# Patient Record
Sex: MALE | Race: BLACK OR AFRICAN AMERICAN | NOT HISPANIC OR LATINO | Employment: FULL TIME | URBAN - METROPOLITAN AREA
[De-identification: names, ages, dates, MRNs, and addresses within clinical notes are randomized per-mention and may not be internally consistent; named-entity substitution may affect disease eponyms.]

---

## 2017-06-14 ENCOUNTER — HOSPITAL ENCOUNTER (EMERGENCY)
Facility: HOSPITAL | Age: 27
Discharge: HOME | End: 2017-06-14
Attending: EMERGENCY MEDICINE
Payer: COMMERCIAL

## 2017-06-14 VITALS
HEART RATE: 91 BPM | TEMPERATURE: 98 F | RESPIRATION RATE: 18 BRPM | HEIGHT: 69 IN | SYSTOLIC BLOOD PRESSURE: 129 MMHG | OXYGEN SATURATION: 99 % | WEIGHT: 245 LBS | DIASTOLIC BLOOD PRESSURE: 76 MMHG | BODY MASS INDEX: 36.29 KG/M2

## 2017-06-14 DIAGNOSIS — N48.9 PENILE LESION: ICD-10-CM

## 2017-06-14 DIAGNOSIS — N39.0 URINARY TRACT INFECTION WITHOUT HEMATURIA, SITE UNSPECIFIED: ICD-10-CM

## 2017-06-14 DIAGNOSIS — R30.0 DYSURIA: Primary | ICD-10-CM

## 2017-06-14 LAB
BACTERIA #/AREA URNS HPF: ABNORMAL /HPF
BILIRUB UR QL STRIP: NEGATIVE
CLARITY UR: CLEAR
COLOR UR: ABNORMAL
GLUCOSE UR QL STRIP: NEGATIVE
HGB UR QL STRIP: NEGATIVE
KETONES UR QL STRIP: ABNORMAL
LEUKOCYTE ESTERASE UR QL STRIP: NEGATIVE
MICROSCOPIC COMMENT: ABNORMAL
NITRITE UR QL STRIP: NEGATIVE
PH UR STRIP: 5 [PH] (ref 5–8)
PROT UR QL STRIP: NEGATIVE
RBC #/AREA URNS HPF: 0 /HPF (ref 0–4)
SP GR UR STRIP: >1.03 (ref 1–1.03)
SQUAMOUS #/AREA URNS HPF: 1 /HPF
URN SPEC COLLECT METH UR: ABNORMAL
UROBILINOGEN UR STRIP-ACNC: NEGATIVE EU/DL
WBC #/AREA URNS HPF: 8 /HPF (ref 0–5)

## 2017-06-14 PROCEDURE — 86592 SYPHILIS TEST NON-TREP QUAL: CPT

## 2017-06-14 PROCEDURE — 86695 HERPES SIMPLEX TYPE 1 TEST: CPT

## 2017-06-14 PROCEDURE — 87529 HSV DNA AMP PROBE: CPT

## 2017-06-14 PROCEDURE — 86696 HERPES SIMPLEX TYPE 2 TEST: CPT

## 2017-06-14 PROCEDURE — 87591 N.GONORRHOEAE DNA AMP PROB: CPT

## 2017-06-14 PROCEDURE — 99283 EMERGENCY DEPT VISIT LOW MDM: CPT

## 2017-06-14 PROCEDURE — 86695 HERPES SIMPLEX TYPE 1 TEST: CPT | Mod: 59

## 2017-06-14 PROCEDURE — 25000003 PHARM REV CODE 250: Performed by: NURSE PRACTITIONER

## 2017-06-14 PROCEDURE — 81000 URINALYSIS NONAUTO W/SCOPE: CPT

## 2017-06-14 RX ORDER — CEPHALEXIN 500 MG/1
500 CAPSULE ORAL EVERY 12 HOURS
Qty: 14 CAPSULE | Refills: 0 | Status: SHIPPED | OUTPATIENT
Start: 2017-06-14 | End: 2017-06-21

## 2017-06-14 RX ORDER — CEPHALEXIN 250 MG/1
500 CAPSULE ORAL
Status: COMPLETED | OUTPATIENT
Start: 2017-06-14 | End: 2017-06-14

## 2017-06-14 RX ORDER — PHENAZOPYRIDINE HYDROCHLORIDE 200 MG/1
200 TABLET, FILM COATED ORAL 3 TIMES DAILY PRN
Qty: 6 TABLET | Refills: 0 | Status: SHIPPED | OUTPATIENT
Start: 2017-06-14 | End: 2017-06-24

## 2017-06-14 RX ORDER — PHENAZOPYRIDINE HYDROCHLORIDE 100 MG/1
200 TABLET, FILM COATED ORAL
Status: COMPLETED | OUTPATIENT
Start: 2017-06-14 | End: 2017-06-14

## 2017-06-14 RX ADMIN — PHENAZOPYRIDINE HYDROCHLORIDE 200 MG: 100 TABLET ORAL at 12:06

## 2017-06-14 RX ADMIN — CEPHALEXIN 500 MG: 250 CAPSULE ORAL at 12:06

## 2017-06-14 NOTE — ED TRIAGE NOTES
Pt presents with burning during & after voiding x3 days.  States that urine has become darker.  Pt states with redness and lac to the end of penis.

## 2017-06-14 NOTE — DISCHARGE INSTRUCTIONS
Please return to the Emergency Department for any new or worsening symptoms including: worsening pain with urination, worsening penile lesions, drainage from the lesions, fever, chest pain, shortness of breath, loss of consciousness, dizziness, weakness, or any other concerns.     Please follow up with your Primary Care Provider within in the week. If you do not have a Primary Care Provider, you may contact the one listed on your discharge paperwork or you may also call the [facility] Appointment Desk at [phone] to schedule an appointment with a Primary Care Provider.     Please take all medication as prescribed.     Gonorrhea/Chlamydia, Herpes, and Syphilis tests are currently pending. If they are abnormal you will be notified. Please refrain from having intercourse until you receive the results. They may take 2 - 4 days to come back.

## 2017-06-14 NOTE — ED PROVIDER NOTES
Encounter Date: 6/14/2017    SCRIBE #1 NOTE: I, Nitish Metcalf , am scribing for, and in the presence of,  EMELIA Luther . I have scribed the following portions of the note - Other sections scribed: HPI/ROS .       History     Chief Complaint   Patient presents with    Dysuria     c/o burning when he voids over past 3 days. Denies penile discharge.     Review of patient's allergies indicates:  No Known Allergies  CC: Dysuria     HPI: This 26 y.o. male presents to the ED c/o a 3-day hx of acute-onset, constant dysuria with associated dark urine. Pt reports having a burning pain during and after urination. He reports his symptoms have remained unchanged from onset. No prior attempted tx. Pt notes recent unprotected sexual intercourse with one female partner, but denies concerns for a possible sexually transmitted infection. Pt otherwise denies fever, hematuria, penile discharge, abdominal pain, back pain, and N/V/D.         The history is provided by the patient. No  was used.     History reviewed. No pertinent past medical history.  History reviewed. No pertinent surgical history.  History reviewed. No pertinent family history.  Social History   Substance Use Topics    Smoking status: Never Smoker    Smokeless tobacco: Not on file    Alcohol use No     Review of Systems   Constitutional: Negative for chills and fever.   Respiratory: Negative for cough and shortness of breath.    Cardiovascular: Negative for chest pain.   Gastrointestinal: Negative for abdominal pain, diarrhea, nausea and vomiting.   Genitourinary: Positive for dysuria. Negative for decreased urine volume, flank pain, frequency, hematuria, penile pain, penile swelling, scrotal swelling and urgency.        (+) dark urine    Musculoskeletal: Negative for back pain.       Physical Exam     Initial Vitals [06/14/17 1039]   BP Pulse Resp Temp SpO2   130/74 93 18 98.6 °F (37 °C) 99 %     Physical Exam    Nursing note and  vitals reviewed.  Constitutional: He appears well-developed and well-nourished. He is not diaphoretic. He is cooperative.  Non-toxic appearance. No distress.   HENT:   Head: Normocephalic and atraumatic.   Right Ear: External ear normal.   Left Ear: External ear normal.   Mouth/Throat: Oropharynx is clear and moist.   Eyes: Conjunctivae and EOM are normal.   Neck: Normal range of motion.   Cardiovascular: Normal rate.   Pulmonary/Chest: No respiratory distress.   Abdominal: Soft. He exhibits no distension. There is no tenderness. There is no rebound and no CVA tenderness. Hernia confirmed negative in the right inguinal area and confirmed negative in the left inguinal area.   Genitourinary: Testes normal. Cremasteric reflex is present. Right testis shows no mass, no swelling and no tenderness. Left testis shows no mass, no swelling and no tenderness. Uncircumcised. Penile tenderness (with palpation of the distal tip of the penis) present. No penile erythema. No discharge found.         Genitourinary Comments: Exam chaperoned by: JÚNIOR Stewart   Neurological: He is alert and oriented to person, place, and time. GCS eye subscore is 4. GCS verbal subscore is 5. GCS motor subscore is 6.   Skin: Skin is warm, dry and intact. Capillary refill takes less than 2 seconds. Lesion (penile) noted. No rash noted.   Psychiatric: He has a normal mood and affect. His behavior is normal. Judgment and thought content normal.         ED Course   Procedures  Labs Reviewed   URINALYSIS - Abnormal; Notable for the following:        Result Value    Specific Gravity, UA >1.030 (*)     Ketones, UA 1+ (*)     All other components within normal limits   URINALYSIS MICROSCOPIC - Abnormal; Notable for the following:     WBC, UA 8 (*)     All other components within normal limits   C. TRACHOMATIS/N. GONORRHOEAE BY AMP DNA   HERPES SIMPLEX (HSV) BY RAPID PCR, NON-BLOOD    Narrative:     Receiving Lab?->[facility]   RPR   HERPES SIMPLEX 1 & 2 IGM  "  HERPES SIMPLEX 1&2 IGG                   APC / Resident Notes:   This is an evaluation of a 26-year-old male that presents emergency Department with complaints of dysuria.  He reports no concern for STI's at this time.  During the exam he mentioned a small "cut" that he noticed on the shaft of his penis.  Denies any known injury to the area. Physical Exam shows a non-toxic, afebrile, and well appearing male.  Uncircumcised male with a small ulceration on the right side of the foreskin with retraction as well as 2 smaller linear abrasions just proximal to the ulceration.  There is no active drainage, and is not tender, there is no erythema.  He has no lesions on the palms.  There is no inguinal lymphadenopathy or hernia.  Testicles are nontender.  His abdomen is soft and nontender.  There is no CVA tenderness. Vital Signs Are Reassuring. RESULTS: Urinalysis with 8 white blood cells, 1+ ketones.  Urine culture pending.  GC cultures, HSV swab and cultures, and syphilis currently pending.  The patient was made aware of the testing being ordered and advised to abstain from intercourse until results are received.  HSV culture taken from the ulceration.     My overall impression is Dysuria, UTI, Penile Lesion. I considered, but at this time, do not suspect testicular torsion, epididymitis, pyelonephritis.  At this time syphilis, HSV, gonorrhea and Chlamydia remain in the differentials pending the results of that testing.    ED Course: Keflex, Pyridium. D/C Meds: Keflex, Pyridium. Additional D/C Information: STD Precaution. The diagnosis, treatment plan, instructions for follow-up and reevaluation with his PCP as well as ED return precautions were discussed and understanding was verbalized. All questions or concerns have been addressed. This case was discussed with and the patient has been examined by Dr. Davison who is in agreement with my assessment and plan. RORO Bob, FNP-C        Scribe Attestation:   Scribe #1: I " performed the above scribed service and the documentation accurately describes the services I performed. I attest to the accuracy of the note.    Attending Attestation:     Physician Attestation Statement for NP/PA:   I have conducted a face to face encounter with this patient in addition to the NP/PA, due to NP/PA Request    Other NP/PA Attestation Additions:     Physical Exam: Multiple erosions and ulcers on penile shaft   Medical Decision Making: I HAVE REVIEWED THIS CASE WITH MY ADVANCED PRACTICE CLINICIAN.      I HAVE PROVIDED A FACE TO FACE EVALUATION OF THIS PATIENT AT THE REQUEST OF MY ADVANCED PRACTICE CLINICIAN.      REASON:  MEDICAL COMPLEXITY    THE PATIENT'S CONDITION WARRANTED PHYSICIAN INVOLVEMENT.  THE TREATMENT REGIMEN WAS REVIEWED BY ME.  I AGREE WITH THE HISTORY, ROS, PHYSICAL, ASSESSMENT AND PLAN OF CARE AS DOCUMENTED BY MY ADVANCED PRACTICE CLINICIAN.      Exam concerning for HSV.  Findings not classic.  Will tx as HSV.  Testing sent.  Test for syphilis, too.             Physician Attestation for Scribe:  Physician Attestation Statement for Scribe #1: I, EMELIA Luther , reviewed documentation, as scribed by Nitish Metcalf  in my presence, and it is both accurate and complete.                 ED Course     Clinical Impression:   The primary encounter diagnosis was Dysuria. Diagnoses of Urinary tract infection without hematuria, site unspecified and Penile lesion were also pertinent to this visit.    Disposition:   Disposition: Discharged  Condition: Stable       EMELIA Luther  06/14/17 1409       Manuel Davison MD  06/14/17 1493

## 2017-06-15 LAB
C TRACH DNA SPEC QL NAA+PROBE: NOT DETECTED
HSV1 DNA SPEC QL NAA+PROBE: POSITIVE
HSV2 DNA SPEC QL NAA+PROBE: NEGATIVE
N GONORRHOEA DNA SPEC QL NAA+PROBE: NOT DETECTED
RPR SER QL: NORMAL
SPECIMEN SOURCE: ABNORMAL

## 2017-06-16 LAB
HSV1 IGG SERPL QL IA: NEGATIVE
HSV2 IGG SERPL QL IA: NEGATIVE

## 2017-06-20 LAB — HSV1+2 IGM SER IA-ACNC: NORMAL INDEX

## 2021-05-21 ENCOUNTER — LAB VISIT (OUTPATIENT)
Dept: LAB | Facility: OTHER | Age: 31
End: 2021-05-21
Payer: OTHER GOVERNMENT

## 2021-05-21 DIAGNOSIS — Z20.822 ENCOUNTER FOR LABORATORY TESTING FOR COVID-19 VIRUS: ICD-10-CM

## 2021-05-21 PROCEDURE — U0003 INFECTIOUS AGENT DETECTION BY NUCLEIC ACID (DNA OR RNA); SEVERE ACUTE RESPIRATORY SYNDROME CORONAVIRUS 2 (SARS-COV-2) (CORONAVIRUS DISEASE [COVID-19]), AMPLIFIED PROBE TECHNIQUE, MAKING USE OF HIGH THROUGHPUT TECHNOLOGIES AS DESCRIBED BY CMS-2020-01-R: HCPCS | Performed by: NURSE PRACTITIONER

## 2021-05-22 LAB — SARS-COV-2 RNA RESP QL NAA+PROBE: NOT DETECTED

## 2021-05-28 ENCOUNTER — LAB VISIT (OUTPATIENT)
Dept: LAB | Facility: OTHER | Age: 31
End: 2021-05-28
Payer: OTHER GOVERNMENT

## 2021-05-28 DIAGNOSIS — Z20.822 ENCOUNTER FOR LABORATORY TESTING FOR COVID-19 VIRUS: ICD-10-CM

## 2021-05-28 PROCEDURE — U0003 INFECTIOUS AGENT DETECTION BY NUCLEIC ACID (DNA OR RNA); SEVERE ACUTE RESPIRATORY SYNDROME CORONAVIRUS 2 (SARS-COV-2) (CORONAVIRUS DISEASE [COVID-19]), AMPLIFIED PROBE TECHNIQUE, MAKING USE OF HIGH THROUGHPUT TECHNOLOGIES AS DESCRIBED BY CMS-2020-01-R: HCPCS | Performed by: NURSE PRACTITIONER

## 2021-05-29 LAB — SARS-COV-2 RNA RESP QL NAA+PROBE: NOT DETECTED

## 2021-06-04 ENCOUNTER — LAB VISIT (OUTPATIENT)
Dept: LAB | Facility: OTHER | Age: 31
End: 2021-06-04
Payer: OTHER GOVERNMENT

## 2021-06-04 DIAGNOSIS — Z20.822 ENCOUNTER FOR LABORATORY TESTING FOR COVID-19 VIRUS: ICD-10-CM

## 2021-06-04 PROCEDURE — U0003 INFECTIOUS AGENT DETECTION BY NUCLEIC ACID (DNA OR RNA); SEVERE ACUTE RESPIRATORY SYNDROME CORONAVIRUS 2 (SARS-COV-2) (CORONAVIRUS DISEASE [COVID-19]), AMPLIFIED PROBE TECHNIQUE, MAKING USE OF HIGH THROUGHPUT TECHNOLOGIES AS DESCRIBED BY CMS-2020-01-R: HCPCS | Performed by: NURSE PRACTITIONER

## 2021-06-05 LAB — SARS-COV-2 RNA RESP QL NAA+PROBE: NOT DETECTED

## 2022-01-08 ENCOUNTER — HOSPITAL ENCOUNTER (EMERGENCY)
Facility: HOSPITAL | Age: 32
Discharge: HOME | End: 2022-01-08
Attending: EMERGENCY MEDICINE
Payer: OTHER GOVERNMENT

## 2022-01-08 ENCOUNTER — PATIENT MESSAGE (OUTPATIENT)
Dept: ADMINISTRATIVE | Facility: OTHER | Age: 32
End: 2022-01-08
Payer: OTHER GOVERNMENT

## 2022-01-08 VITALS
HEART RATE: 76 BPM | RESPIRATION RATE: 18 BRPM | SYSTOLIC BLOOD PRESSURE: 175 MMHG | TEMPERATURE: 99 F | DIASTOLIC BLOOD PRESSURE: 90 MMHG | OXYGEN SATURATION: 96 %

## 2022-01-08 DIAGNOSIS — U07.1 COVID-19 VIRUS INFECTION: Primary | ICD-10-CM

## 2022-01-08 LAB
CTP QC/QA: YES
SARS-COV-2 RDRP RESP QL NAA+PROBE: POSITIVE

## 2022-01-08 PROCEDURE — 99283 EMERGENCY DEPT VISIT LOW MDM: CPT | Mod: CR,CS,, | Performed by: NURSE PRACTITIONER

## 2022-01-08 PROCEDURE — U0002 COVID-19 LAB TEST NON-CDC: HCPCS | Performed by: EMERGENCY MEDICINE

## 2022-01-08 PROCEDURE — 99282 EMERGENCY DEPT VISIT SF MDM: CPT

## 2022-01-08 PROCEDURE — 99283 PR EMERGENCY DEPT VISIT,LEVEL III: ICD-10-PCS | Mod: CR,CS,, | Performed by: NURSE PRACTITIONER

## 2022-01-08 NOTE — ED PROVIDER NOTES
Chief complaint:  COVID-19 Concerns (Fever cough)      HPI:  Alberto Youngblood is a 31 y.o. male presenting for COVID testing.  He developed a mild intermittent frontal headache on 12/31/2021.  Denies neck stiffness.  Denies fever chills.  He denies URI symptoms, cough, shortness of breath or chest pain.  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  He was tested outpatient yesterday but states he has not received his results.  No other problems or concerns voiced at this time.        Review of patient's allergies indicates:  No Known Allergies    No current facility-administered medications on file prior to encounter.     No current outpatient medications on file prior to encounter.       PMH:  As per HPI and below:  History reviewed. No pertinent past medical history.  History reviewed. No pertinent surgical history.    Social History     Socioeconomic History    Marital status: Single   Tobacco Use    Smoking status: Never Smoker   Substance and Sexual Activity    Alcohol use: No    Drug use: No    Sexual activity: Yes     Partners: Female       No family history on file.    Review of Systems  Constitutional: Negative for fever.   HENT: Negative for congestion. Negative for sinus pressure.    Eyes: Negative for visual disturbance.   Respiratory: Negative for cough. Negative for shortness of breath.    Cardiovascular: Negative for chest pain.   Gastrointestinal: Negative for abdominal pain, diarrhea, nausea and vomiting.   Genitourinary: Negative for flank pain. Negative for dysuria.  Musculoskeletal: Negative for myalgias.  Skin: Negative for rash and wound.   Neurological: Negative for dizziness. Negative for weakness and numbness. Positive for headache.  Psychiatric/Behavioral: Negative for confusion.       Physical Exam:    Nursing note and vitals reviewed.  Constitutional: Patient appears well-developed  and well-nourished. Not diaphoretic. No distress.   HENT:   Head: Normocephalic and atraumatic. No sinus tenderness.  Eyes: Conjunctivae are normal. No scleral icterus.   Neck: Normal range of motion.   Pulmonary/Chest: No respiratory distress. Speaking in complete sentences. No accessory muscle use.  Musculoskeletal: Normal range of motion.   Neurological: Alert and oriented to person, place, and time.  Skin: Skin is warm and dry.   Psychiatric: Normal mood and affect. Thought content normal.       Labs Reviewed   SARS-COV-2 RDRP GENE       Imaging Results    None         No results found.        MDM:    31 y.o. male with mild intermittent frontal headache that began on 12/31/21.  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  He had outpatient COVID testing done yesterday but did not receive his results.  Upon chart review, it is noted that he tested positive for COVID yesterday.  He is afebrile, nontoxic and in no distress.  Today is day 8 of symptoms.  Discussed with him the current CDC isolation guidelines.  He is stable for discharge home.  ED return precautions discussed.      Diagnoses:  The encounter diagnosis was COVID-19 virus infection.       Michelle Cordova NP  01/08/22 9049

## 2022-01-08 NOTE — DISCHARGE INSTRUCTIONS
"Current CDC guidelines on isolation timeline after a positive COVID-19 test:      https://www.cdc.gov/coronavirus/2019-ncov/your-health/quarantine-isolation.html    "Ending isolation for people who had COVID-19 and had symptoms:    If you had COVID-19 and had symptoms, isolate for at least 5 days. To calculate your 5-day isolation period, day 0 is your first day of symptoms. Day 1 is the first full day after your symptoms developed. You can leave isolation after 5 full days.    You can end isolation after 5 full days if you are fever-free for 24 hours without the use of fever-reducing medication and your other symptoms have improved (Loss of taste and smell may persist for weeks or months after recovery and need not delay the end of isolation).    You should continue to wear a well-fitting mask around others at home and in public for 5 additional days (day 6 through day 10) after the end of your 5-day isolation period. If you are unable to wear a mask when around others, you should continue to isolate for a full 10 days. Avoid people who are immunocompromised or at high risk for severe disease, and nursing homes and other high-risk settings, until after at least 10 days.    If you continue to have fever or your other symptoms have not improved after 5 days of isolation, you should wait to end your isolation until you are fever-free for 24 hours without the use of fever-reducing medication and your other symptoms have improved. Continue to wear a well-fitting mask. Contact your healthcare provider if you have questions.    Do not travel during your 5-day isolation period. After you end isolation, avoid travel until a full 10 days after your first day of symptoms. If you must travel on days 6-10, wear a well-fitting mask when you are around others for the entire duration of travel. If you are unable to wear a mask, you should not travel during the 10 days.    Do not go to places where you are unable to wear a mask, " "such as restaurants and some gyms, and avoid eating around others at home and at work until a full 10 days after your first day of symptoms."    "

## 2022-01-08 NOTE — Clinical Note
"Albertogeremias Youngblood (Randy) was seen and treated in our emergency department on 1/8/2022.     COVID-19 is present in our communities across the state. There is limited testing for COVID at this time, so not all patients can be tested. In this situation, your employee meets the following criteria:    Alberto Youngblood has met the criteria for COVID-19 testing and has a POSITIVE result. He can return to work once they are asymptomatic for 24 hours without the use of fever reducing medications AND at least five days from the first positive result. A mask is recommended for 5 days post quarantine.     If you have any questions or concerns, or if I can be of further assistance, please do not hesitate to contact me.    Sincerely,             Marlena Barkley Jr., MD"

## 2022-01-08 NOTE — ED TRIAGE NOTES
Patient identifiers for Alberto Youngblood 31 y.o. male checked and correct.  Chief Complaint   Patient presents with    COVID-19 Concerns     Fever cough     History reviewed. No pertinent past medical history.  Allergies reported: Review of patient's allergies indicates:  No Known Allergies      LOC: Patient is awake, alert, and aware of environment with an appropriate affect. Patient is oriented x 4 and speaking appropriately.  APPEARANCE: Patient resting comfortably and in no acute distress. Patient is clean and well groomed, patient's clothing is properly fastened.  SKIN: The skin is warm and dry. Patient has normal skin turgor and moist mucus membranes.   MUSKULOSKELETAL: Patient is moving all extremities well, no obvious deformities noted. Pulses intact.   RESPIRATORY: Airway is open and patent. Respirations are spontaneous and non-labored with normal effort and rate. Reports a cough  CARDIAC: Patient has a normal rate No peripheral edema noted.   ABDOMEN: No distention noted. Soft and non-tender upon palpation.  NEUROLOGICAL: , PERRL. Facial expression is symmetrical. Hand grasps are equal bilaterally. Normal sensation in all extremities when touched with finger.

## 2022-03-28 ENCOUNTER — LAB VISIT (OUTPATIENT)
Dept: LAB | Facility: OTHER | Age: 32
End: 2022-03-28
Payer: OTHER GOVERNMENT

## 2022-03-28 DIAGNOSIS — Z20.822 ENCOUNTER FOR LABORATORY TESTING FOR COVID-19 VIRUS: ICD-10-CM

## 2022-03-28 LAB
SARS-COV-2 RNA RESP QL NAA+PROBE: NOT DETECTED
SARS-COV-2- CYCLE NUMBER: NORMAL

## 2022-03-28 PROCEDURE — U0003 INFECTIOUS AGENT DETECTION BY NUCLEIC ACID (DNA OR RNA); SEVERE ACUTE RESPIRATORY SYNDROME CORONAVIRUS 2 (SARS-COV-2) (CORONAVIRUS DISEASE [COVID-19]), AMPLIFIED PROBE TECHNIQUE, MAKING USE OF HIGH THROUGHPUT TECHNOLOGIES AS DESCRIBED BY CMS-2020-01-R: HCPCS | Performed by: EMERGENCY MEDICINE

## 2022-05-02 ENCOUNTER — LAB VISIT (OUTPATIENT)
Dept: LAB | Facility: OTHER | Age: 32
End: 2022-05-02

## 2022-05-02 DIAGNOSIS — Z20.822 ENCOUNTER FOR LABORATORY TESTING FOR COVID-19 VIRUS: ICD-10-CM

## 2022-05-02 PROCEDURE — U0003 INFECTIOUS AGENT DETECTION BY NUCLEIC ACID (DNA OR RNA); SEVERE ACUTE RESPIRATORY SYNDROME CORONAVIRUS 2 (SARS-COV-2) (CORONAVIRUS DISEASE [COVID-19]), AMPLIFIED PROBE TECHNIQUE, MAKING USE OF HIGH THROUGHPUT TECHNOLOGIES AS DESCRIBED BY CMS-2020-01-R: HCPCS | Performed by: EMERGENCY MEDICINE

## 2022-05-03 LAB
SARS-COV-2 RNA RESP QL NAA+PROBE: NOT DETECTED
SARS-COV-2- CYCLE NUMBER: NORMAL